# Patient Record
Sex: FEMALE | Race: WHITE
[De-identification: names, ages, dates, MRNs, and addresses within clinical notes are randomized per-mention and may not be internally consistent; named-entity substitution may affect disease eponyms.]

---

## 2022-06-05 NOTE — NUR
ADMIT NOTE
 
71 YR OLD FEMALE ADMITTED TO FLOOR FROM THE ED WITH DX OF COPD EXACERBATION.
HX EMPHYSEMA, ALERT AND ORIENTED X 4. SOLUMEDROL ADMINISTERED - SEE MAR FOR
DETAILS. SEE RT DOCUMENTATION FOR DETAILS. NOTESOME SOB WITH PHYSICAL
EXERTION. ENCOURAGED TO USE CALL LIGHT IF NEEDING TO GET OUT OF BED FOR HER
SAFETY. CALL LIGHT IN REACH

## 2022-06-05 NOTE — NUR
NIGHT SHIFT SUMMARY
 
PT ADMITTED EARLIER IN THE SHIFT WITH COPD EXACERBATION. RT ASSESSED HER AND
PROVIDED TREATMENTS. RECEIVING MEDS TO HELP RESPIRATIONS. NOTE SOB WITH
PHYSICAL EXERTION. INSTRUCTED TO USE CALL LIGHT IF NEEDING TO GET OOB.
CURRENTLY RESTING QUIETLY. VSS.

## 2022-06-05 NOTE — NUR
SHIFT SUMMARY;  PATIENT HAD NO ACUTE CHANGES IN CONDITION NOTED DURING DAY
SHIFT.  SHE REMAINS ON 3 LITERS.  HOME O2 EVAL BY LELIA NELSON SHOWS PATIENT
NEEDS O2 AT HOME.  SHE DESATS WITH EXERTION AND AT BASELINE STILL REQUIRES O2
SUPPORT.
 
PATIENT IS AO 4 DURING DAY AND HAS PLEASANT AFFECT AND IS COOPERATIVE WITH
CARE.  SHE AMBULATES WITH STAND BY ASSIST TO THE BATHROOM AND BACK USING A
FWW.  SHE USES CALL LIGHT TO MAKE HER NEEDS KNOWN AND WAITS FOR STAFF TO COME
TO ROOM TO GET OUT OF BED.
 
HAND OFF AT SHIFT CHANGE TO KAYLA PAULINO

## 2022-06-06 NOTE — NUR
AWAKENED FOR SCHEDULED MED. VOICED SLEEPING BETTER HERE, NOT AS ANXIOUS AS WHE
WAS BEFORE. CALL LIGHT IN REACH

## 2022-06-06 NOTE — NUR
DISCHARGE SUMMARY
PATIENT DISCHARGED HOME WITH HOME HEALTH. DISCHARGE PAPERWORK REVIEWED WITH
PATIENT AND ALL QUESTIONS ANSWERED. PRESCRIPTIONS SENT TO PATIENTS PREFERRED
PHARMACY. IV AND TELE D/C'D. SHERITA DELIVERED O2 TANK TO HOSPITAL ROOM AND
WILL BRING CONCENTRATOR TO PATIENTS HOME. ALL BELONGINGS PACKED AND AWAITING
PATIENTS  TO ARRIVE AND TAKE HER HOME.

## 2022-06-06 NOTE — NUR
NIGHT SHIFT SUMMARY
 
HAS BEEN RSTING QUIETLY WITH OCCASIONAL INTERRUPTIONS - UP TO BATHROOM WITH
ASSIST. AFFECT LESS ANXIOUS THAN NOTED 24 HR PREVIOUS. VOICED FELT MORE AT
EASE HERE, FEELING LESS ANXIOUS. O2 PER NC AT 2L/MIN. LUNG SOUNDS REMAIN
DIMINISHED. CALL LIGHT IN REACH.

## 2022-07-08 NOTE — NUR
Shift Summary
A/Ox4. Very pleasant and cooperative. Fearful to ambulate to bathroom alone
due to dyspnea with exertion. Still on 4L O2. Lung sounds have wheezes
throughout. Occ nonproductive cough. Resp panel obtained. Tele: 's. Diet
advanced to [facility] soft/ground meat and patient tolerating this well. New IV
placed d/t previous one being tender. Continent of bowel and bladder.
Medicated x 1 for HA with tylenol.

## 2022-07-08 NOTE — NUR
ADVANCED DIET AS TOLERATED
PATIENT REQUESTING DIET TO BE ADVANCED. CARLOS ALBERTO.O. RECEIVED FROM DR. KIMBROUGH TO
ADVANCE DIET.

## 2022-07-08 NOTE — NUR
REPORT RECEIVED FROM ARABELLA SANCHEZ NURSE. ON 07/08/22 @0205, PT ADMITTED VIA
STRETCHER. PT ON 02@ 4LPM VIA N/C. SOB ON EXERTION WHEN PT TRANSFERRED FROM
STRETCHER TO BED.

## 2022-07-09 NOTE — NUR
SHIFT SUMMARY
 
PT A&O X 4. INDEPENDENT IN THE ROOM FOR RESTROOM USE. IS ON 2 L's O2 WITH SATS
>92%. C/O HA, MEDICATED WITH ULTRAM PER MD ORDER. ANTICIPATE PT MAY BE DC'D
HOME TOMORROW.

## 2022-07-09 NOTE — NUR
A&OX4. V/S WNL. WRITER TITRATED PT DOWN FROM 4LPM TO 2LPM VIA NC. O2 SAT >95%
SBA. MECHANICAL SOFT DIET. PILLS WHOLE WITH WATER. VOIDS W/O DIFFICULTY. NO BM
THIS SHIFT. IV TO R) FOREARM. FC. PRN ROBITUSSIN GIVEN FOR COUGH & TYLENOL,
TRAMADOL GIVEN FOR HEADACHE AS ORDERED. HEADACHED RESOLVED. WILL CONTINUE TO
MONITOR.

## 2022-07-10 NOTE — NUR
SHIFT SUMMARY
 
A&O X 4. VSS. ON RA WITH O2 SATS >90%. NO C/O DYSPNEA TODAY.  MECHANICAL SOFT
DIET, APPETITE GOOD.  PIV IN R FA, INTACT & PATENT. INDEPENDENT IN THE ROOM
FOR ALL ADL'S. VOIDS WITHOUT DIFFICULTY. PER MD ORDERS MEDICATED TWICE WITH
TRAMADOL FOR HA WITH GOOD RESULTS. TAKE PILLS WHOLE WITH H2O. PLAN IS LIKELY
DC HOME TOMORROW.

## 2022-07-10 NOTE — NUR
A&OX4. V/S WNL. 02@1LPM VIA NC. SBA. MECHANICAL SOFT DIET. PILLS WHOLE WITH
WATER. IV TO R) FA. VOIDS W/O DIFFICULTY. NO BM THIS SHIFT. FC. PRN TRAMADOL
FOR PAIN PER EMAR. WILL CONTINUE TO MONITOR.

## 2022-07-11 NOTE — NUR
DISCHARGE SUMMARY:
PATIENT INDEPENDENT IN THE ROOM THROUGHOUT THE SHIFT. PATIENT STABLE ON RA
WITH MINIMAL SHORTNESS OF BREATH. PATIENT DENIED DIZZINESS DURING OR
DIFFICULTY WITH ACTIVITY. PATIENT READY FOR DISCHARGE.
DISCHARGE RX FAXED TO [facility] PER PATIENT REQUEST. DISHARGED INSTRUCTIONS
GIVEN TO PATIENT. ALL QUESTIONS AND CONCERNS ADDRESSED. PATIENT DENIES FURTHER
NEEDS. PATIENT DISCHARGE IN WHEELCHAIR WITH CNA AND . PATIENT STABLE AT
TIME OF DISCHARGE.

## 2022-07-11 NOTE — NUR
RUN OF SVT:
NOTIFIED BY TELEMETRY THAT THE PATIENT HAD EXPERIENCED A RUN OF SVT. STRIP
SCANNED IN. PATIENT WAS ASYMPTOMATIC. VITALS STABLE. PATIENT REPORTS THAT SHE
SOMETIMES "DOES THIS". NOTIFIED DR. LUCIANO. NO NEW ORDERS AT THIS TIME.

## 2023-02-20 ENCOUNTER — HOSPITAL ENCOUNTER (OUTPATIENT)
Dept: HOSPITAL 95 - CT | Age: 73
Discharge: HOME | End: 2023-02-20
Attending: NURSE PRACTITIONER
Payer: MEDICARE

## 2023-02-20 DIAGNOSIS — R07.9: Primary | ICD-10-CM

## 2023-02-20 DIAGNOSIS — E27.9: ICD-10-CM

## 2023-02-20 DIAGNOSIS — I10: ICD-10-CM

## 2023-02-20 DIAGNOSIS — R79.89: ICD-10-CM

## 2023-04-10 ENCOUNTER — HOSPITAL ENCOUNTER (OUTPATIENT)
Dept: HOSPITAL 95 - MHTC | Age: 73
Discharge: HOME | End: 2023-04-10
Attending: NURSE PRACTITIONER
Payer: MEDICARE

## 2023-04-10 VITALS — HEIGHT: 65 IN | BODY MASS INDEX: 22.66 KG/M2 | WEIGHT: 136.03 LBS

## 2023-04-10 DIAGNOSIS — I10: ICD-10-CM

## 2023-04-10 DIAGNOSIS — I25.118: Primary | ICD-10-CM

## 2023-04-10 DIAGNOSIS — J43.9: ICD-10-CM

## 2023-04-10 DIAGNOSIS — E27.8: ICD-10-CM

## 2023-04-10 DIAGNOSIS — K44.9: ICD-10-CM

## 2023-04-10 PROCEDURE — C1769 GUIDE WIRE: HCPCS

## 2023-04-10 PROCEDURE — C1894 INTRO/SHEATH, NON-LASER: HCPCS

## 2023-04-10 PROCEDURE — C1887 CATHETER, GUIDING: HCPCS

## 2023-04-10 PROCEDURE — A9270 NON-COVERED ITEM OR SERVICE: HCPCS

## 2023-04-10 NOTE — NUR
PATIENT DISCAHRGED HOME AT THIS TIME.  PRESENT AT BEDSIDE. DISCHARGE
INTRUCTIONS AND PATIETN BELONGINGS LEFT WITH PATIENT. R RADIAL TR BAND
REMOVED, CLOTH DOT APPLIED. PATIENT INSTRUCTED ON RADIAL ACCESS AFTER CARE.
PIV REMOVED WITHOUT DIFFICULTY, CATHETER INTACT. VSS ON 2 L NC PER BASELINE.

## 2023-04-10 NOTE — NUR
3 OF AIR REMOVED FROM R RADIAL TR BAND. SITE C/D/I, SOFT/NONTENDER, NO
EVIDENCE OF HEMATOMA. DISCHARGE PAPEROWRK AND MEDICATIONS REVIEWED WITH
PATIENT. ALL QUESTIONS WERE ANSWERED. PATIENT INSTRUCTED ON RADIAL ACCESS
AFTER CARE. PT TOELRATING PO INTAKE WELL. VSS ON 2 L NC, BASELINE.

## 2023-04-10 NOTE — NUR
PATIENT SITTING UP IN RECLINER, TOLERATING PO INTAKE WELL. R RADIAL TR BAND IN
PLACE, SITE C/D/I, NO EVIDENCE OF HEMATOMA. VSS ON 2 L NC, BASELINE.

## 2023-04-10 NOTE — NUR
ALL AIR REMOVED FROM R RADIAL TR BAND. SITE C/D/I, SOFT/NONTENDER, NO EVIDENCE
OF HEMATOMA. VSS ON 2 L NC PER BASELINE.

## 2023-04-10 NOTE — NUR
PATIENT ARRIVED TO RECOVERY ROOM ISTTING UPRIGHT IN RECLINER. R RADIAL TR BAND
IN PLACE, SITE C/D/I, SOFT/NONTENDER, NO EVIDENCE OF HEMATOMA. PATIENT
CONVERSING APPROPRIATELY. PATIENT DENYING ANY CHEST PAIN. VSS ON 2 L NC PER
BASELINE.